# Patient Record
(demographics unavailable — no encounter records)

---

## 2024-10-11 NOTE — PHYSICAL EXAM
[No Acute Distress] : no acute distress [Well Nourished] : well nourished [Well Developed] : well developed [Ill-Appearing] : ill-appearing [Normal Sclera/Conjunctiva] : normal sclera/conjunctiva [PERRL] : pupils equal round and reactive to light [EOMI] : extraocular movements intact [Normal Outer Ear/Nose] : the outer ears and nose were normal in appearance [Normal Oropharynx] : the oropharynx was normal [No JVD] : no jugular venous distention [No Lymphadenopathy] : no lymphadenopathy [Supple] : supple [Thyroid Normal, No Nodules] : the thyroid was normal and there were no nodules present [No Respiratory Distress] : no respiratory distress  [No Accessory Muscle Use] : no accessory muscle use [Clear to Auscultation] : lungs were clear to auscultation bilaterally [Normal Rate] : normal rate  [Regular Rhythm] : with a regular rhythm [Normal S1, S2] : normal S1 and S2 [No Murmur] : no murmur heard [No Carotid Bruits] : no carotid bruits [No Abdominal Bruit] : a ~M bruit was not heard ~T in the abdomen [No Varicosities] : no varicosities [Pedal Pulses Present] : the pedal pulses are present [No Edema] : there was no peripheral edema [No Palpable Aorta] : no palpable aorta [No Extremity Clubbing/Cyanosis] : no extremity clubbing/cyanosis [Soft] : abdomen soft [Non Tender] : non-tender [Non-distended] : non-distended [No Masses] : no abdominal mass palpated [No HSM] : no HSM [Normal Bowel Sounds] : normal bowel sounds [Normal Posterior Cervical Nodes] : no posterior cervical lymphadenopathy [Normal Anterior Cervical Nodes] : no anterior cervical lymphadenopathy [No CVA Tenderness] : no CVA  tenderness [No Spinal Tenderness] : no spinal tenderness [No Joint Swelling] : no joint swelling [Grossly Normal Strength/Tone] : grossly normal strength/tone [No Rash] : no rash [Coordination Grossly Intact] : coordination grossly intact [No Focal Deficits] : no focal deficits [Normal Gait] : normal gait [Deep Tendon Reflexes (DTR)] : deep tendon reflexes were 2+ and symmetric [Normal Affect] : the affect was normal [Normal Insight/Judgement] : insight and judgment were intact

## 2024-10-11 NOTE — HEALTH RISK ASSESSMENT
[No] : In the past 12 months have you used drugs other than those required for medical reasons? No [No falls in past year] : Patient reported no falls in the past year [0] : 2) Feeling down, depressed, or hopeless: Not at all (0) [PHQ-2 Negative - No further assessment needed] : PHQ-2 Negative - No further assessment needed [Never] : Never [de-identified] : regular [[id number]] : 0

## 2024-10-11 NOTE — ASSESSMENT
[FreeTextEntry1] : saltwater gargles. supportive care, fluids.   rest. Tylenol prn. if no improvement, return to office.

## 2024-10-11 NOTE — HISTORY OF PRESENT ILLNESS
[Moderate] : moderate [___ Days ago] : [unfilled] days ago [Sudden] : suddenly [Constant] : constant [Congestion] : congestion [Cough] : cough [Sore Throat] : sore throat [Earache] : earache [Headache] : headache [OTC Remedies] : OTC remedies [In Morning] : in the morning [Stable] : stable [Wheezing] : no wheezing [Chills] : no chills [Anorexia] : no anorexia [Shortness Of Breath] : no shortness of breath [Fatigue] : not fatigue [Fever] : no fever [FreeTextEntry2] : body aches, clear mucus , sinus pain  [FreeTextEntry5] : [employer] [FreeTextEntry8] : Daughter sick with congestion and cough, no fever. She snores and has large tonsil No recent travel.

## 2024-11-01 NOTE — HISTORY OF PRESENT ILLNESS
[Never] : never [TextBox_4] : 25F   possibly hx of asthma as a kid, only remembers there was an episode where she was outside in cold air running and had trouble breathing.  never hospitalized for asthma.  Carries a rescue inhaler but hasn't needed it as an adult.    PCP wanted her tested for john because of snoing, enlarged tonsils and some daytime fatigue.  Never had sleep testing before.

## 2024-12-12 NOTE — PROCEDURE
[FreeTextEntry1] : Reviewed:  HST: no john identified ahi 2  PFT: Normal spirometry.  Lung volumes normal. DLCO normal.

## 2024-12-12 NOTE — REASON FOR VISIT
[Home] : at home, [unfilled] , at the time of the visit. [Medical Office: ([facility])___] : at the medical office located in  [Patient] : the patient

## 2025-07-09 NOTE — HISTORY OF PRESENT ILLNESS
[FreeTextEntry1] : Pt is a 26-year-old who presents today for well woman exam. LMP 6/19/2025 POB: svdx1 PGYN: reg, nl pap  PMH: preeclampsia PSH: denies Med: aurovela All: nkma SH: social ETOH  FH: denies

## 2025-07-09 NOTE — DISCUSSION/SUMMARY
[FreeTextEntry1] : I spent the time noted on the day of this patient encounter preparing for, providing and documenting the above service. I have counseled and educated the patient on the differential, workup, disease course, and treatment/management plan. Education was provided to the patient during this encounter. All questions and concerns were answered and addressed in detail.  Ella Bob NP, FNP-BC

## 2025-07-09 NOTE — PHYSICAL EXAM
[Chaperoned Physical Exam] : A chaperone was present in the examining room during all aspects of the physical examination. [MA] : MA [Appropriately responsive] : appropriately responsive [Alert] : alert [No Acute Distress] : no acute distress [Soft] : soft [Non-tender] : non-tender [Non-distended] : non-distended [No HSM] : No HSM [No Lesions] : no lesions [No Mass] : no mass [Oriented x3] : oriented x3 [Examination Of The Breasts] : a normal appearance [No Masses] : no breast masses were palpable [FreeTextEntry2] : Kelly  [Labia Majora] : normal [Labia Minora] : normal [Normal] : normal [Uterine Adnexae] : normal